# Patient Record
Sex: MALE | Employment: UNEMPLOYED | URBAN - METROPOLITAN AREA
[De-identification: names, ages, dates, MRNs, and addresses within clinical notes are randomized per-mention and may not be internally consistent; named-entity substitution may affect disease eponyms.]

---

## 2021-11-11 ENCOUNTER — HOSPITAL ENCOUNTER (OUTPATIENT)
Age: 36
Discharge: HOME | End: 2021-11-11
Payer: COMMERCIAL

## 2021-11-11 ENCOUNTER — OFFICE VISIT (OUTPATIENT)
Dept: RHEUMATOLOGY | Age: 36
End: 2021-11-11
Payer: COMMERCIAL

## 2021-11-11 ENCOUNTER — HOSPITAL ENCOUNTER (OUTPATIENT)
Dept: GENERAL RADIOLOGY | Age: 36
Discharge: HOME | End: 2021-11-11
Payer: COMMERCIAL

## 2021-11-11 VITALS
DIASTOLIC BLOOD PRESSURE: 80 MMHG | SYSTOLIC BLOOD PRESSURE: 124 MMHG | WEIGHT: 151 LBS | BODY MASS INDEX: 22.88 KG/M2 | HEIGHT: 68 IN

## 2021-11-11 DIAGNOSIS — Z79.899 HIGH RISK MEDICATION USE: ICD-10-CM

## 2021-11-11 DIAGNOSIS — M05.79 RHEUMATOID ARTHRITIS INVOLVING MULTIPLE SITES WITH POSITIVE RHEUMATOID FACTOR (HCC): ICD-10-CM

## 2021-11-11 DIAGNOSIS — M05.79 RHEUMATOID ARTHRITIS INVOLVING MULTIPLE SITES WITH POSITIVE RHEUMATOID FACTOR (HCC): Primary | ICD-10-CM

## 2021-11-11 LAB
A/G RATIO: 0.8 (ref 1.1–2.2)
ALBUMIN SERPL-MCNC: 3.7 G/DL (ref 3.4–5)
ALP BLD-CCNC: 121 U/L (ref 40–129)
ALT SERPL-CCNC: 10 U/L (ref 10–40)
ANION GAP SERPL CALCULATED.3IONS-SCNC: 14 MMOL/L (ref 3–16)
AST SERPL-CCNC: 13 U/L (ref 15–37)
BILIRUB SERPL-MCNC: 0.6 MG/DL (ref 0–1)
BUN BLDV-MCNC: 8 MG/DL (ref 7–20)
CALCIUM SERPL-MCNC: 8.9 MG/DL (ref 8.3–10.6)
CHLORIDE BLD-SCNC: 100 MMOL/L (ref 99–110)
CO2: 24 MMOL/L (ref 21–32)
CREAT SERPL-MCNC: 0.7 MG/DL (ref 0.9–1.3)
GFR AFRICAN AMERICAN: >60
GFR NON-AFRICAN AMERICAN: >60
GLUCOSE BLD-MCNC: 76 MG/DL (ref 70–99)
POTASSIUM SERPL-SCNC: 4.1 MMOL/L (ref 3.5–5.1)
SODIUM BLD-SCNC: 138 MMOL/L (ref 136–145)
TOTAL PROTEIN: 8.4 G/DL (ref 6.4–8.2)

## 2021-11-11 PROCEDURE — 73130 X-RAY EXAM OF HAND: CPT

## 2021-11-11 PROCEDURE — 99205 OFFICE O/P NEW HI 60 MIN: CPT | Performed by: INTERNAL MEDICINE

## 2021-11-11 RX ORDER — PREDNISONE 10 MG/1
TABLET ORAL
Qty: 90 TABLET | Refills: 0 | Status: SHIPPED | OUTPATIENT
Start: 2021-11-11 | End: 2022-01-21 | Stop reason: SDUPTHER

## 2021-11-11 RX ORDER — FOLIC ACID 1 MG/1
TABLET ORAL
Qty: 90 TABLET | Refills: 0 | Status: SHIPPED | OUTPATIENT
Start: 2021-11-11 | End: 2022-06-15 | Stop reason: ALTCHOICE

## 2021-11-11 RX ORDER — COVID-19 ANTIGEN TEST
KIT MISCELLANEOUS
COMMUNITY

## 2021-11-11 RX ORDER — FERROUS SULFATE 325(65) MG
325 TABLET ORAL
COMMUNITY

## 2021-11-11 NOTE — PATIENT INSTRUCTIONS
RHEUMATOID ARTHRITIS People have long feared rheumatoid arthritis (commonly called RA) as one of the most disabling types of arthritis. The good news is that the outlook has greatly improved for many people with newly diagnosed (detected) RA. Of course, RA remains a serious disease, and one that can vary widely in symptoms (what you feel) and outcomes. Even so, treatment advances have made it possible to stop or at least slow the progression (worsening) of joint damage. Rheumatologists now have many new treatments that target the inflammation that RA causes. They also understand better when and how to use treatments to get the best effects. Fast facts  RA is an autoimmune disease. A faulty immune system (the bodys defense system) triggers it. RA is the most common type of autoimmune arthritis. At least 1.3 million [place] adults have RA. Treatments have improved greatly and help many of those affected. Rheumatologists are doctors who have the expertise to correctly diagnose this disease and to offer patients the most advanced treatments. What is rheumatoid arthritis? RA is a chronic (long-term) disease that causes pain, stiffness, swelling and limited motion and function of many joints. While RA can affect any joint, the small joints in the hands and feet tend to be involved most often. Inflammation sometimes can affect organs as well, for instance, the eyes or lungs. The stiffness seen in active RA is most often worst in the morning. It may last one to two hours (or even the whole day). Stiffness for a long time in the morning is a clue that you may have RA, since few other arthritic diseases behave this way. For instance, osteoarthritis most often does not cause prolonged morning stiffness. Other signs and symptoms that can occur in RA include:   Loss of energy   Low fevers   Loss of appetite   Dry eyes and mouth from a related health problem, Sjogren's syndrome   Firm lumps, called rheumatoid physical exam, such as warmth, swelling and pain in the joints. Some blood tests also can help confirm RA. Telltale signs include: Anemia (a low red blood cell count)   Rheumatoid factor (an antibody, or blood protein, found in about 80% of patients with RA in time, but in as few as 30% at the start of arthritis)   Antibodies to cyclic citrullinated peptides (pieces of proteins), or anti-CCP for short (found in 60-70% of patients with RA)   Elevated erythrocyte sedimentation rate (a blood test that, in most patients with RA, confirms the amount of inflammation in the joints)  X-rays can help in detecting RA, but may not show anything abnormal in early arthritis. Even so, these first X-rays may be useful later to show if the disease is progressing. Often, MRI and ultrasound scanning are done to help  the severity of RA. There is no single test that confirms an RA diagnosis for most patients with this disease. (This is above all true for patients who have had symptoms fewer than six months.) Rather, a doctor makes the diagnosis by looking at the symptoms and results from the physical exam, lab tests and X-rays. How is rheumatoid arthritis treated? Therapy for RA has improved greatly in the past 30 years. Current treatments give most patients good or excellent relief of symptoms and let them keep functioning at, or near, normal levels. With the right medications, many patients can achieve remission -- that is, have no signs of active disease. There is no cure for RA. The goal of treatment is to lessen your symptoms and poor function. Doctors do this by starting proper medical therapy as soon as possible, before your joints have lasting damage. No single treatment works for all patients. Many people with RA must change their treatment at least once during their lifetime. Good control of RA requires early diagnosis and, at times, aggressive treatment.  Thus, patients with a diagnosis of RA should begin their treatment with disease-modifying antirheumatic drugs -- referred to as DMARDs. These drugs not only relieve symptoms but also slow progression of the disease. Often, doctors prescribe DMARDs along with nonsteroidal anti-inflammatory drugs or NSAIDs and/or low-dose corticosteroids, to lower swelling, pain and fever. DMARDs have greatly improved the symptoms, function and quality of life for nearly all patients with RA. Ask your rheumatologist about the need for DMARD therapy and the risks and benefits of these drugs. Common DMARDs include methotrexate (brand names include Rheumatrex® and Folex®), leflunomide ([address]), hydroxychloroquine (Plaquenil) and sulfasalazine (Azulfidine). Older DMARDs include gold, given as a pill -- auranofin (Ridaura) -- or more often as an injection into a muscle (such as Myochrysine). The antibiotic minocycline (e.g., Minocin, Dynacin and Vectrin) also is a DMARD, as are the immune suppressants azathioprine (Imuran) and cyclosporine (Sandimmune and Neoral). These three drugs and gold are rarely prescribed for RA these days because other drugs work better or have fewer side effects. Patients with more serious disease may need medications called biologic response modifiers or biologic agents.  They can target the parts of the immune system and the signals that lead to inflammation and joint and tissue damage. These medications are also DMARDs. FDA-approved drugs of this type include abatacept (Orencia), adalimumab (Humira), anakinra (Kineret), certolizumab (Cimzia), etanercept (Enbrel), golimumab (Simponi) infliximab (Remicade), rituximab (Rituxan) and tocilizumab (Actemra). Most often, patients take these drugs with methotrexate, as the mix of medicines is more helpful. The best treatment of RA needs more than medicines alone. Patient education, such as how to cope with RA, also is important.  Proper care requires the expertise of a team of providers, including rheumatologists, primary care physicians, and physical and occupational therapists. You will need frequent visits through the year with your rheumatologist. These checkups let your doctor track the course of your disease and check for any side effects of your medications. You likely also will need to repeat blood tests and X-rays or ultrasounds from time to time. What is the broader health impact of rheumatoid arthritis? Research shows that people with RA, mainly those whose disease is not well controlled, have a higher risk for heart disease and stroke. Talk with your doctor about these risks and ways to lower them. Living with rheumatoid arthritis   It is important to be physically active most of the time, but to sometimes scale back activities when the disease flares. In general, rest is helpful when a joint is inflamed, or when you feel tired. At these times, do gentle range-of-motion exercises, such as stretching. This will keep the joint flexible. When you feel better, do low-impact aerobic exercises, such as walking, and exercises to boost muscle strength. This will improve your overall health and reduce pressure on your joints. A physical or occupational therapist can help you find which types of activities are best for you, and at what level or pace you should do them. Finding that you have a chronic illness is a life-changing event. It can cause worry and sometimes feelings of isolation or depression. Thanks to greatly improved treatments, these feelings tend to decrease with time as energy improves, and pain and stiffness decrease. Discuss these normal feelings with your health care providers. They can provide helpful information and resources. Rheumatoid arthritis affects the wrist and the small joints of the hand, including the knuckles and the middle joints of the fingers. Points to remember  Newer treatments are effective. RA drugs have greatly improved outcomes for patients.  For most people with RA, early treatment can control joint pain and swelling, and lessen joint damage. Seek an expert in arthritis: a rheumatologist. Expertise is vital to make an early diagnosis of RA and to rule out diseases that mimic RA, thus avoiding unneeded tests and treatments. A doctor who is an expert in RA also can design a customized treatment plan that is best suited for you. Therefore, the rheumatologist, working with the primary care physician and other health care providers, should supervise the treatment of the patient with RA. Start treatment early. Studies show that people who receive early treatment for RA feel better sooner and more often, and are more likely to lead an active life. They also are less likely to have the type of joint damage that leads to joint replacement. The rheumatologist's role in the treatment of rheumatoid arthritis   RA is a complex disease, but many advances in treatment have occurred recently. Rheumatologists are doctors who are experts in diagnosing and treating arthritis and other diseases of the joints, muscles and bones. Thus, they are best qualified to make a proper diagnosis of RA. They can also advise patients about the best treatment options. To find a rheumatologist  For a list of rheumatologists in your area, click here. Learn more about rheumatologists and rheumatology health professionals. For more information  The [facility] [employer] of Rheumatology has compiled this list to give you a starting point for your own additional research. The ACR does not endorse or maintain these web sites, and is notresponsible for any information or claims provided on them. It is always best to talk with your rheumatologist for more information and before making any decisions about your care. Rheumatology [address] advances research and training to improve the health of people with rheumatic diseases. www. rheumatology. org/REF  The Arthritis Foundation  www. arthritis. [employer] of Arthritis and Musculoskeletal and [address]  www.niams. nih.gov   Updated August 2012  Written by Carmen Bliss MD, and Mary Lewis MD, and reviewed by the [employer] of Rheumatology Communications and Marketing Committee. This patient fact sheet is provided for general education only. Individuals should consult a qualified health care provider for professional medical advice, diagnosis and treatment of a medical or health condition.   [facility] of Rheumatology

## 2021-11-11 NOTE — PROGRESS NOTES
[address], MD                                                           [address] [place] [address] [phone] (P) [phone] (F)      Note is transcribed using voice recognition software. Inadvertent computerized transcription errors may be present. Patient identification: Judd Hill, waleska : 1985,36 y.o. REASON FOR CONSULTATION:  Patient is being seen at the request of  No primary care provider on file. / Veronica Caldwell, *for evaluation management of rheumatoid arthritis. HISTORY OF PRESENT ILLNESS:  29 y.o. male has been experiencing musculoskeletal discomfort for about a year-mild intermittent but has been constant and severe for last couple of months-affecting all joints in his upper and lower extremities-associated with pain, swelling, stiffness-worse in the morning and after inactivity, states that all his bones ache and hurt. It is affecting ADLs and recreational activities. Laboratory evaluation positive JACEK 1-160, rheumatoid factor 63, sedimentation rate 120, CRP 38, normal uric acid. CBC normocytic normochromic anemia and hemoglobin of 10 g%. No psoriasis, inflammatory bowel diseases, or any family history of rheumatoid arthritis. All other review of systems are negative. PMH, PSH,Social history , Meds reviewed. FH-no family history of RA. Autoimmune disorders. PHYSICAL EXAM:    Vitals:    /80   Ht 5' 8\" (1.727 m)   Wt 151 lb (68.5 kg)   BMI 22.96 kg/m²   AA)x3 well nourished, and well groomed, normal judgement.     MKS:   28 joint JOINT COUNT:                               Right                                                  Left   Swell Tender Swell Tender   PIP1    x x  x   PIP2    x  x x    PIP3  xxx  x  x  x   PIP4          PIP5  x  x      MCP1           MCP2  x  x  x  x   MCP3  x    x  x   MCP4  x  x       MCP5    x       Wrist  x  x  x  x   Elbow    x    x   Shoulder    x   x   Knee    x    x     Fist 60% b/l approx  limited ROM at wrist by 50 %  Shoulder b/l limited by approx 50%  B/l ankles tender in joint line , left ankle swollen, tender, inflamed  MTP are tender b/l. Skin: No rashes, no induration or skin thickening or nodules. HEENT: Normal lids, lacrimal glands and pupils. No oral or nasal ulcers. Salivary glands reveal no evidence of abnormality. External inspection of the ears and nose within normal limits. DATA:   See HPI    ASSESSMENT AND PLAN:  Tess Harrell was seen today for establish care and joint pain. Diagnoses and all orders for this visit:    Rheumatoid arthritis involving multiple sites with positive rheumatoid factor (HCC)  -     XR HAND LEFT (MIN 3 VIEWS); Future  -     XR HAND RIGHT (MIN 3 VIEWS); Future    High risk medication use  -     predniSONE (DELTASONE) 10 MG tablet; 3 tab po daily x 7 days. 2 tab po daily x 7 days. 1.5 tab po daily x 7 days. 1 tab daily therafter  -     Comprehensive Metabolic Panel; Future  -     Hepatitis B Surface Antigen; Future  -     Hepatitis B Core Antibody, IgM; Future  -     Hepatitis B Surface Antibody; Future  -     Hepatitis C Antibody; Future  -     Cyclic Citrul Peptide Antibody, IgG; Future  -     JACEK Reflex to Antibody Cascade; Future  -     Cancel: Urinalysis; Future  -     folic acid (FOLVITE) 1 MG tablet; Take 1 tab po daily. -     methotrexate (RHEUMATREX) 2.5 MG chemo tablet; Week 1: 4 tablets by mouth once a week. Week 2: 5 tablets /week. Week 3: 6 tablets/ week, thereafter: 7 tablets/ week. Same day of the week every week.  Safety alert: Labs as recommended by prescribing MD.      New diagnosis of seropositive rheumatoid arthritis-onset fall 2021-presented with inflammatory polyarthritis affecting small, intermediate and large joints. Plan-  Explained diagnosis and management options and need for further work-up as above to prepare for medications as well as to assess disease activity. Various treatment options were discussed with patient including conventional DMARDs, biologic therapy as well as rescue medications. Start methotrexate folic acid and prednisone. Side effects, directions, monitoring explained including but not limited to immune suppression, infection risk, BM,GI/Liver/lung/renal toxicity, malignancy, fertility and teratogenic effect, need for reliable contraception is must and conception needs to be planned. This is a high risk medication- requires periodic blood monitoring & office visits,  alcohol abstinence, hold in case of any infection. Please read package insert and call us with any question. Discuss with primary provider for age specific vaccinations and cancer screening. Reading information provided. Patient verbalizes the understanding. All his questions were answered. Reassessment in 4 weeks. #################################################################################################  Patient indicates understanding and agrees with the management plan. Total time >80 minutes that includes the following-  Preparing to see the patient such as reviewing patients records, pre-charting, preparing the visit on the same day, performing a medically appropriate history and physical examination, counseling and educating patient about diagnosis, management plan, ordering appropriate testings, prescriptions, communicating findings to other care providers, and documenting clinical information in electronic medical record. I thank you for giving me the opportunity to be involved in [address] [place] [address] and I look forward following Aleah along with you. If you have any questions or concerns please feel free to contact me at any time.   Maira Manzanares MD 11/11/21

## 2021-11-12 ENCOUNTER — HOSPITAL ENCOUNTER (OUTPATIENT)
Age: 36
Discharge: HOME | End: 2021-11-12
Payer: COMMERCIAL

## 2021-11-12 DIAGNOSIS — R76.8 HEPATITIS B SURFACE ANTIGEN POSITIVE: ICD-10-CM

## 2021-11-12 DIAGNOSIS — R76.8 HEPATITIS B SURFACE ANTIGEN POSITIVE: Primary | ICD-10-CM

## 2021-11-12 LAB
ANTI-NUCLEAR ANTIBODY (ANA): NEGATIVE
CYCLIC CITRULLINATED PEPTIDE ANTIBODY IGG: 73.2 U/ML (ref 0–2.9)
HBV SURFACE AB TITR SER: 10.77 MIU/ML
HEPATITIS B CORE IGM ANTIBODY: NORMAL
HEPATITIS B SURFACE ANTIGEN INTERPRETATION: REACTIVE
HEPATITIS C ANTIBODY INTERPRETATION: NORMAL

## 2021-11-12 PROCEDURE — 36415 COLL VENOUS BLD VENIPUNCTURE: CPT

## 2021-11-12 PROCEDURE — 86705 HEP B CORE ANTIBODY IGM: CPT

## 2021-11-12 PROCEDURE — 87341 HEP B SURFACE AG NEUTRLZJ IA: CPT

## 2021-11-12 PROCEDURE — 87517 HEPATITIS B DNA QUANT: CPT

## 2021-11-12 PROCEDURE — 87340 HEPATITIS B SURFACE AG IA: CPT

## 2021-11-13 LAB
HEPATITIS B CORE IGM ANTIBODY: NORMAL
HEPATITIS B SURFACE ANTIGEN INTERPRETATION: REACTIVE

## 2021-11-16 LAB
HBV QNT LOG, IU/ML: 4.02 LOG IU/ML
HBV QNT, IU/ML: ABNORMAL IU/ML
INTERPRETATION: DETECTED

## 2021-11-17 ENCOUNTER — OFFICE VISIT (OUTPATIENT)
Dept: RHEUMATOLOGY | Age: 36
End: 2021-11-17
Payer: COMMERCIAL

## 2021-11-17 VITALS
HEIGHT: 68 IN | SYSTOLIC BLOOD PRESSURE: 124 MMHG | WEIGHT: 151 LBS | BODY MASS INDEX: 22.88 KG/M2 | DIASTOLIC BLOOD PRESSURE: 80 MMHG

## 2021-11-17 DIAGNOSIS — B19.10 HEPATITIS B INFECTION WITHOUT DELTA AGENT WITHOUT HEPATIC COMA, UNSPECIFIED CHRONICITY: ICD-10-CM

## 2021-11-17 DIAGNOSIS — M05.79 RHEUMATOID ARTHRITIS INVOLVING MULTIPLE SITES WITH POSITIVE RHEUMATOID FACTOR (HCC): Primary | ICD-10-CM

## 2021-11-17 LAB
HEPATITIS B SURFACE ANTIGEN CONFIRMATION: POSITIVE
HEPATITIS B SURFACE ANTIGEN CONFIRMATION: POSITIVE

## 2021-11-17 PROCEDURE — 99215 OFFICE O/P EST HI 40 MIN: CPT | Performed by: INTERNAL MEDICINE

## 2021-11-17 NOTE — PROGRESS NOTES
[address], MD                                                           [address] [place] [address]                                                             [phone] (P) [phone] (F)      Note is transcribed using voice recognition software. Inadvertent computerized transcription errors may be present. Patient identification: Za Purvis, male : 1985,36 y.o. Background information:  1. Seropositive rheumatoid arthritis-new diagnosis-symptom onset 2021. Sporadic, self-limiting infrequent flares since early part of . Serologically-RF positive-63, CCP 73, JACEK 1-160.    2.  Hepatitis B infection-with positive surface antigen and viral load-labs 2021. Incidental detection during inflammatory arthritis work-up. Normal LFTs. Does not recall how he might have acquired it. Interval changes:  I saw him as a new patient on 2021 for new onset of inflammatory arthritis-seropositive rheumatoid arthritis. Hepatitis serologies were ordered to prepare for methotrexate treatment and also for work-up of inflammatory arthritis. Has hep B infection with positive viral load. Is currently taking 20 mg of prednisone which is helping 70% in terms of his joint symptoms. Liver function test is normal.    PMH, PSH,Social history , Meds reviewed. FH-no family history of RA. Autoimmune disorders. PHYSICAL EXAM:    Vitals:    /80   Ht 5' 8\" (1.727 m)   Wt 151 lb (68.5 kg)   BMI 22.96 kg/m²   AA)x3 well nourished, and well groomed, normal judgement.     MKS:   28 joint JOINT COUNT:                               Right                                                  Left   Swell Tender Swell Tender   PIP1           PIP2          PIP3  x  x PIP4          PIP5        MCP1           MCP2  x  x       MCP3  x  x       MCP4           MCP5          Wrist  x  x       Elbow           Shoulder          Knee             Fist 70% R- full left  limited ROM R wrist by 50 %  Shoulder FROM  MTP are tender b/l. Skin: No rashes, no induration or skin thickening or nodules. DATA:   See HPI    ASSESSMENT AND PLAN:  Tramaine Sun was seen today for follow-up and abnormal test results. Diagnoses and all orders for this visit:    Rheumatoid arthritis involving multiple sites with positive rheumatoid factor ([facility] [address]    Hepatitis B infection without delta agent without hepatic coma, unspecified chronicity  -     [facility] - Tripp Jeff MD, Gastroenterology, [place]      New diagnosis of seropositive rheumatoid arthritis-onset fall 2021. Hep B infection with viral load + and HBs Ag +    Normal LFTs. Plan-  1. Hep B infection- Refer to GI for further management. He  will be needing immunosuppressive  Medication for RA- either anti-TNF therapy or abatacept after hep B viral load is negative. Patient is aware that there is always a risk of reactivation of Hep B even after Rx but less with these agents compared to Rituxan. Avoid methotrexate. Referral documents, labs faxed to Dr Melba Trujillo. Explained diagnosis, possible route of transmission, and the need for his partner to be checked for hepatitis B infection as well. Patient showed understanding. 2.  Seropositive RA-currently taking 20 mg of prednisone, recommend reducing 10 mg a day. We will meet back after hep C treatment to plan for biologic therapy. #################################################################################################  Patient indicates understanding and agrees with the management plan.   Total time >40 minutes that includes the following-  Preparing to see the patient such as reviewing patients records, pre-charting, preparing the visit on the same day, performing a medically appropriate history and physical examination, counseling and educating patient about diagnosis, management plan, ordering appropriate testings, prescriptions, communicating findings to other care providers, and documenting clinical information in electronic medical record. I thank you for giving me the opportunity to be involved in [address] [place] [address] and I look forward following Aleah along with you. If you have any questions or concerns please feel free to contact me at any time.   Maira Manzanares MD 11/17/21

## 2022-02-23 DIAGNOSIS — Z79.899 HIGH RISK MEDICATION USE: ICD-10-CM

## 2022-02-23 RX ORDER — PREDNISONE 10 MG/1
TABLET ORAL
Qty: 30 TABLET | Refills: 0 | OUTPATIENT
Start: 2022-02-23

## 2022-03-07 ENCOUNTER — OFFICE VISIT (OUTPATIENT)
Dept: RHEUMATOLOGY | Age: 37
End: 2022-03-07
Payer: COMMERCIAL

## 2022-03-07 VITALS
WEIGHT: 150 LBS | DIASTOLIC BLOOD PRESSURE: 74 MMHG | SYSTOLIC BLOOD PRESSURE: 118 MMHG | HEIGHT: 68 IN | BODY MASS INDEX: 22.73 KG/M2

## 2022-03-07 DIAGNOSIS — B19.10 HEPATITIS B INFECTION WITHOUT DELTA AGENT WITHOUT HEPATIC COMA, UNSPECIFIED CHRONICITY: ICD-10-CM

## 2022-03-07 DIAGNOSIS — Z79.899 HIGH RISK MEDICATION USE: ICD-10-CM

## 2022-03-07 DIAGNOSIS — M05.79 RHEUMATOID ARTHRITIS INVOLVING MULTIPLE SITES WITH POSITIVE RHEUMATOID FACTOR (HCC): Primary | ICD-10-CM

## 2022-03-07 LAB
C-REACTIVE PROTEIN: 44.6 MG/L (ref 0–5.1)
SEDIMENTATION RATE, ERYTHROCYTE: 110 MM/HR (ref 0–15)

## 2022-03-07 PROCEDURE — 99215 OFFICE O/P EST HI 40 MIN: CPT | Performed by: INTERNAL MEDICINE

## 2022-03-07 RX ORDER — PREDNISONE 10 MG/1
TABLET ORAL
Qty: 90 TABLET | Refills: 0 | Status: SHIPPED | OUTPATIENT
Start: 2022-03-07 | End: 2022-06-15 | Stop reason: ALTCHOICE

## 2022-03-07 RX ORDER — ENTECAVIR 0.5 MG/1
0.5 TABLET, FILM COATED ORAL DAILY
COMMUNITY

## 2022-03-07 NOTE — PATIENT INSTRUCTIONS
Patient Education        etanercept  Pronunciation:  ee TAN er sept  Brand:  Enbrel, Erelzi Prefilled Syringe, Erelzi Sensoready Pen  What is the most important information I should know about etanercept? Etanercept affects your immune system. You may get infections more easily, even serious or fatal infections. Call your doctor if you have signs of infection (fever, cough, night sweats, pale skin, bruising or bleeding, loss of appetite, weight loss, feeling very tired). Using etanercept may increase your risk of developing certain types of cancer, including lymphoma. Ask your doctor about your specific risk. What is etanercept? Etanercept is a tumor necrosis factor (TNF) blocker that is used in adults to prevent joint damage caused by rheumatoid arthritis, psoriatic arthritis, or ankylosing spondylitis. Etanercept is also used to treat plaque psoriasis in adults and children at least 3years old. Etanercept is used to treat polyarticular juvenile idiopathic arthritis in children who are at least 3years old. Etanercept is sometimes used with another medicine called methotrexate. Etanercept may also be used for purposes not listed in this medication guide. What should I discuss with my healthcare provider before using etanercept? You should not use etanercept if you are allergic to it, or if you have a severe infection such as sepsis (infection throughout your body). Tell your doctor if you have any signs of infection, such as:   · fever, chills, sweats, flu-like symptoms, feeling very tired;  · cough, shortness of breath, coughing up blood;  · diarrhea, weight loss;  · skin warmth or redness, open sores; or  · increased urination, burning when you urinate.   Tell your doctor if you have ever had:  · a weak immune system, HIV, tuberculosis;  · hepatitis B;  · diabetes;  · congestive heart failure;  · a nerve disorder such as multiple sclerosis or Guillain-Barré syndrome;  · seizures;  · a latex allergy; or  · if you are scheduled to receive any vaccines. Children should be current on all childhood immunizations before starting treatment with etanercept. Using etanercept may increase your risk of developing certain types of cancer, including lymphoma. This has occurred mainly in children and teenagers using TNF-blockers. However, anyone with an inflammatory autoimmune disorder may have a higher risk of lymphoma. Talk with your doctor about your own risk. Tell your doctor if you have ever had tuberculosis or if anyone in your household has tuberculosis. Also tell your doctor if you have recently traveled. Tuberculosis and some fungal infections are more common in certain parts of the world, and you may have been exposed during travel. Tell your doctor if you are pregnant or plan to become pregnant. You will need to tell your baby's doctor if you used etanercept during pregnancy, especially before the baby receives any childhood vaccines. It may not be safe to breastfeed while using this medicine. Ask your doctor about any risk. How should I use etanercept? Follow all directions on your prescription label and read all medication guides or instruction sheets. Use the medicine exactly as directed. Before you start treatment with etanercept, your doctor may perform tests to make sure you do not have an infection. Etanercept is injected under the skin. A healthcare provider may teach you how to properly use the medication by yourself. Read and carefully follow any Instructions for Use provided with your medicine. Ask your doctor or pharmacist if you don't understand all instructions. Do not shake this medicine. Prepare an injection only when you are ready to give it. Do not use if the medicine looks cloudy, has changed colors, or has particles in it. Call your pharmacist for new medicine. You may need to mix etanercept with a liquid (diluent).  When using injections by yourself, be sure you understand how to properly mix and store the medicine. Etanercept doses are based on weight in children. Your child's dose needs may change if the child gains or loses weight. If you need surgery, tell the surgeon ahead of time that you are using etanercept. Etanercept affects your immune system. You may get infections more easily, even serious or fatal infections. Your doctor will need to examine you on a regular basis. If you've ever had hepatitis B, using etanercept can cause this virus to become active or get worse. You may need frequent liver function tests while using this medicine and for several months after you stop. Carefully follow all storage instructions provided with your medicine. Cartridges, injection pens, prefilled syringes, vials, and diluent are stable at specific temperatures for only a certain number of days or weeks. Throw away any medicine not used within that time. Keep unopened etanercept in its original carton in the refrigerator. Protect from light. Do not freeze. Do not use after the expiration date on the label has passed. You may also store etanercept at room temperature for a short time. Enbrel may be kept at room temperature for up to 14 days. Shonna Florian may be kept at room temperature for up to 28 days. If you store your medicine at room temperature, throw it away after the length of time stated for the brand you are using. Protect the medicine from light and extreme hot or cold temperatures. Once the medicine has reached room temperature, do not put it back into the refrigerator. Each cartridge, injection pen, or prefilled syringe is for one use only. Throw it away after one use, even if there is still medicine left inside. Use a needle and syringe only once and then place them in a puncture-proof \"sharps\" container. Follow state or local laws about how to dispose of this container. Keep it out of the reach of children and pets. What happens if I miss a dose?   Call your doctor for instructions if you miss a dose of etanercept. What happens if I overdose? Seek emergency medical attention or call the Poison Help line at 1-457.220.6071. What should I avoid while using etanercept? Avoid injecting etanercept into skin that is bruised, tender, red, or hard. Do not receive a \"live\" vaccine while using etanercept. The vaccine may not work as well and may not fully protect you from disease. Live vaccines include measles, mumps, rubella (MMR), polio, rotavirus, typhoid, yellow fever, varicella (chickenpox), and zoster (shingles). Avoid being near people who are sick or have infections. Call your doctor for preventive treatment if you are exposed to chickenpox or measles. These conditions can be serious or even fatal in people who are using etanercept. What are the possible side effects of etanercept? Get emergency medical help if you have signs of an allergic reaction:  hives; difficult breathing; swelling of your face, lips, tongue, or throat. Serious and sometimes fatal infections may occur.    Call your doctor right away if you have:  · fever, chills, flu symptoms;  · pale skin, easy bruising or bleeding;  · pain, redness, or swelling where etanercept was injected (for longer than 5 days after injection);  · signs of lymphoma --fever, night sweats, weight loss, stomach pain or swelling, swollen glands (in your neck, armpits, or groin);  · signs of tuberculosis --cough, night sweats, loss of appetite, weight loss, feeling very tired;  · new or worsening psoriasis --skin redness or scaly patches, raised bumps filled with pus;  · nerve problems --dizziness, numbness or tingling, problems with vision, or weak feeling in your arms or legs;  · signs of heart failure --shortness of breath, swelling in your lower legs;  · lupus-like syndrome --joint pain or swelling, chest discomfort, feeling short of breath, skin rash on your cheeks or arms (worsens in sunlight); o  · liver problems --right-sided upper stomach pain, vomiting, tiredness, loss of appetite, yellowing of your skin or eyes. Common side effects may include:  · pain, swelling, itching, or redness where the medicine was injected; or  · cold symptoms such as stuffy nose, sneezing, sore throat. This is not a complete list of side effects and others may occur. Call your doctor for medical advice about side effects. You may report side effects to FDA at 3-677-PMW-6704. What other drugs will affect etanercept? Tell your doctor about all your other medicines, especially:  · abatacept (Orencia);  · anakinra (Kineret);  · cyclophosphamide (Cytoxan); or  · insulin or oral diabetes medicine. This list is not complete. Other drugs may affect etanercept, including prescription and over-the-counter medicines, vitamins, and herbal products. Not all possible drug interactions are listed here. Where can I get more information? Your doctor or pharmacist can provide more information about etanercept. Remember, keep this and all other medicines out of the reach of children, never share your medicines with others, and use this medication only for the indication prescribed. Every effort has been made to ensure that the information provided by [address] is accurate, up-to-date, and complete, but no guarantee is made to that effect. Drug information contained herein may be time sensitive. [employer] information has been compiled for use by healthcare practitioners and consumers in the [place] and therefore [employer] does not warrant that uses outside of the [place] are appropriate, unless specifically indicated otherwise. [employer]'s drug information does not endorse drugs, diagnose patients or recommend therapy.  [employer] drug information is an informational resource designed to assist licensed healthcare practitioners in caring for their patients and/or to serve consumers viewing this service as a supplement to, and not a substitute for, the expertise, skill, knowledge and judgment of healthcare practitioners. The absence of a warning for a given drug or drug combination in no way should be construed to indicate that the drug or drug combination is safe, effective or appropriate for any given patient. [facility] does not assume any responsibility for any aspect of healthcare administered with the aid of information [facility] provides. The information contained herein is not intended to cover all possible uses, directions, precautions, warnings, drug interactions, allergic reactions, or adverse effects. If you have questions about the drugs you are taking, check with your doctor, nurse or pharmacist.  Copyright 2984-3889 [address]: 20.01. Revision date: 2/19/2021. Care instructions adapted under license by [facility] ([facility]). If you have questions about a medical condition or this instruction, always ask your healthcare professional. [place] [address] any warranty or liability for your use of this information.

## 2022-03-07 NOTE — PROGRESS NOTES
[address], [place]                                                           [address] [place] [address]                                                             [phone] (P) [phone] (F)      Note is transcribed using voice recognition software. Inadvertent computerized transcription errors may be present. Patient identification: Ronnie Peñaloza, male : 1985,36 y.o. Background information:  1. Seropositive rheumatoid arthritis-new diagnosis-symptom onset 2021. Sporadic, self-limiting infrequent flares since early part of . Serologically-RF positive-63, CCP 73, JACEK 1-160.    2.  Hepatitis B infection-with positive surface antigen and viral load-labs 2021. Incidental detection during inflammatory arthritis work-up. Dr. Elif Torres, started entecavir 2022. Interval changes: This is a acute visit for worsening arthritis flare in his finger joints, wrists, ankle and feet, is limiting his ability to work at [employer]. He just finished a course of prednisone taper last week. Is not on any DMARDs because of incidental detection of hep B, started entecavir in January. Is tolerating medication well. He just saw Dr. Luh Allred earlier this month, office notes reviewed-scanned in media section. PMH, PSH,Social history , Meds reviewed. FH-no family history of RA. Autoimmune disorders. PHYSICAL EXAM:    Vitals:    /74   Ht 5' 8\" (1.727 m)   Wt 150 lb (68 kg)   BMI 22.81 kg/m²   AA)x3 well nourished, and well groomed, normal judgement.     MKS:   28 joint JOINT COUNT:                               Right                                                  Left   Swell Tender Swell Tender   PIP1           PIP2          PIP3  x  x PIP4  x x       PIP5        MCP1           MCP2  x  x  x  x   MCP3  x  x  x  x   MCP4           MCP5          Wrist  x  x  xx  x   Elbow    x  x  x   Shoulder          Knee             Right hand-loose fist, left hand full fist  Range of motion both wrist are limited, left much worse than right. Both ankles are swollen, tender, actively inflamed. MTPs are tender to pressure bilaterally. Skin: No rashes, no induration or skin thickening or nodules. DATA:   See HPI    ASSESSMENT AND PLAN:  Jericho Mccarthy was seen today for follow-up and joint pain. Diagnoses and all orders for this visit:    Rheumatoid arthritis involving multiple sites with positive rheumatoid factor (HCC)    High risk medication use  -     C-Reactive Protein; Future  -     Sedimentation Rate; Future  -     Quantiferon, Incubated; Future    Hepatitis B infection without delta agent without hepatic coma, unspecified chronicity    Other orders  -     predniSONE (DELTASONE) 10 MG tablet; Take 1 Tab daily. Seropositive rheumatoid arthritis (positives RF and CCP)-onset fall 2021. High disease activity. Hep B infection with viral load + and HBs Ag +-started entecavir January 2021. Normal LFTs. Plan-  Prednisone 20 mg X 1 week, thereafter maintain on 10 mg a day. Communicated with Dr. Gypsy Ferrera, awaiting hep B viral load, if negative-we will plan for anti-TNF therapy. Meantime, check safety labs including TB QuantiFERON. He is taking entecavir for hep B. Addendum:  Discussed with GI- Hep B virus undetectable. Will Pa Enbrel. Side effects and directions, monitoring explained including risk of Hep B reactivation if he is off of antiviral therapy. Reading info given. #################################################################################################  Patient indicates understanding and agrees with the management plan.   Total time >40 minutes that includes the following-  Preparing to see the patient such as reviewing patients records, pre-charting, preparing the visit on the same day, performing a medically appropriate history and physical examination, counseling and educating patient about diagnosis, management plan, ordering appropriate testings, prescriptions, communicating findings to other care providers, and documenting clinical information in electronic medical record. I thank you for giving me the opportunity to be involved in [address] [place] [address] and I look forward following Aleah along with you. If you have any questions or concerns please feel free to contact me at any time.   Eb Harden MD 03/07/22

## 2022-03-08 RX ORDER — ETANERCEPT 50 MG/ML
SOLUTION SUBCUTANEOUS
Qty: 4 ML | Refills: 3 | Status: SHIPPED | OUTPATIENT
Start: 2022-03-08 | End: 2022-09-15 | Stop reason: SDUPTHER

## 2022-03-10 LAB
QUANTI TB GOLD PLUS: NEGATIVE
QUANTI TB1 MINUS NIL: 0 IU/ML (ref 0–0.34)
QUANTI TB2 MINUS NIL: 0.01 IU/ML (ref 0–0.34)
QUANTIFERON MITOGEN: 1.53 IU/ML
QUANTIFERON NIL: 0.03 IU/ML

## 2022-06-15 ENCOUNTER — OFFICE VISIT (OUTPATIENT)
Dept: RHEUMATOLOGY | Age: 37
End: 2022-06-15
Payer: COMMERCIAL

## 2022-06-15 VITALS
HEIGHT: 68 IN | WEIGHT: 150 LBS | BODY MASS INDEX: 22.73 KG/M2 | SYSTOLIC BLOOD PRESSURE: 110 MMHG | DIASTOLIC BLOOD PRESSURE: 68 MMHG

## 2022-06-15 DIAGNOSIS — Z79.899 HIGH RISK MEDICATION USE: ICD-10-CM

## 2022-06-15 DIAGNOSIS — M05.79 RHEUMATOID ARTHRITIS INVOLVING MULTIPLE SITES WITH POSITIVE RHEUMATOID FACTOR (HCC): Primary | ICD-10-CM

## 2022-06-15 PROCEDURE — 99214 OFFICE O/P EST MOD 30 MIN: CPT | Performed by: INTERNAL MEDICINE

## 2022-06-15 RX ORDER — ETANERCEPT 50 MG/ML
SOLUTION SUBCUTANEOUS
Qty: 4 ML | Refills: 2 | Status: SHIPPED | OUTPATIENT
Start: 2022-06-15 | End: 2022-09-15

## 2022-06-15 RX ORDER — HYDROXYCHLOROQUINE SULFATE 200 MG/1
200 TABLET, FILM COATED ORAL 2 TIMES DAILY
Qty: 60 TABLET | Refills: 2 | Status: SHIPPED | OUTPATIENT
Start: 2022-06-15 | End: 2022-09-15 | Stop reason: SDUPTHER

## 2022-06-15 NOTE — PROGRESS NOTES
Elbow           Shoulder          Knee             Other than active disease in both wrist-normal [address] exam in upper and lower extremities. Skin: No rashes, no induration or skin thickening or nodules. DATA:   See HPI    ASSESSMENT AND PLAN:  Liseth Aguiar was seen today for follow-up. Diagnoses and all orders for this visit:    Rheumatoid arthritis involving multiple sites with positive rheumatoid factor (HCC)  -     hydroxychloroquine (PLAQUENIL) 200 MG tablet; Take 1 tablet by mouth 2 times daily    High risk medication use  -     CBC with Auto Differential; Future  -     Hepatic Function Panel; Future  -     Creatinine; Future  -     C-Reactive Protein; Future  -     Sedimentation Rate; Future    Other orders  -     Etanercept (ENBREL SURECLICK) 50 MG/ML SOAJ; Inject 50 mg sc weekly. Seropositive rheumatoid arthritis (positives RF and CCP)-onset fall 2021. Persistent activity. Continue Enbrel weekly, prescription authorized. Add hydroxychloroquine-side effects, directions, monitoring explained. Safety labs to be checked in 4 to 6 weeks. Hep B infection with viral load + and HBs Ag +-started entecavir January 2021. Normal LFTs. Call with any symptoms, RTC 3 months. #################################################################################################  Patient indicates understanding and agrees with the management plan. Total time 33 minutes that includes the following-  Preparing to see the patient such as reviewing patients records, pre-charting, preparing the visit on the same day, performing a medically appropriate history and physical examination, counseling and educating patient about diagnosis, management plan, ordering appropriate testings, prescriptions, communicating findings to other care providers, and documenting clinical information in electronic medical record.   I thank you for giving me the opportunity to be involved in [address] [place] [address] and I look forward following Aleah along with you. If you have any questions or concerns please feel free to contact me at any time.   Sury Coyle MD 06/15/22

## 2022-07-20 DIAGNOSIS — Z79.899 HIGH RISK MEDICATION USE: ICD-10-CM

## 2022-07-20 LAB
ALBUMIN SERPL-MCNC: 4.1 G/DL (ref 3.4–5)
ALP BLD-CCNC: 96 U/L (ref 40–129)
ALT SERPL-CCNC: 8 U/L (ref 10–40)
AST SERPL-CCNC: 13 U/L (ref 15–37)
BASOPHILS ABSOLUTE: 0 K/UL (ref 0–0.2)
BASOPHILS RELATIVE PERCENT: 0.7 %
BILIRUB SERPL-MCNC: 0.7 MG/DL (ref 0–1)
BILIRUBIN DIRECT: <0.2 MG/DL (ref 0–0.3)
BILIRUBIN, INDIRECT: ABNORMAL MG/DL (ref 0–1)
C-REACTIVE PROTEIN: 18.2 MG/L (ref 0–5.1)
EOSINOPHILS ABSOLUTE: 0.1 K/UL (ref 0–0.6)
EOSINOPHILS RELATIVE PERCENT: 1.2 %
HCT VFR BLD CALC: 39.4 % (ref 40.5–52.5)
HEMOGLOBIN: 12.8 G/DL (ref 13.5–17.5)
LYMPHOCYTES ABSOLUTE: 2.4 K/UL (ref 1–5.1)
LYMPHOCYTES RELATIVE PERCENT: 37.6 %
MCH RBC QN AUTO: 23.6 PG (ref 26–34)
MCHC RBC AUTO-ENTMCNC: 32.5 G/DL (ref 31–36)
MCV RBC AUTO: 72.8 FL (ref 80–100)
MONOCYTES ABSOLUTE: 0.5 K/UL (ref 0–1.3)
MONOCYTES RELATIVE PERCENT: 7.4 %
NEUTROPHILS ABSOLUTE: 3.5 K/UL (ref 1.7–7.7)
NEUTROPHILS RELATIVE PERCENT: 53.1 %
PDW BLD-RTO: 15.2 % (ref 12.4–15.4)
PLATELET # BLD: 272 K/UL (ref 135–450)
PMV BLD AUTO: 7.6 FL (ref 5–10.5)
RBC # BLD: 5.41 M/UL (ref 4.2–5.9)
SEDIMENTATION RATE, ERYTHROCYTE: 70 MM/HR (ref 0–15)
TOTAL PROTEIN: 7.8 G/DL (ref 6.4–8.2)
WBC # BLD: 6.5 K/UL (ref 4–11)

## 2022-09-13 NOTE — PROGRESS NOTES
[id number], [address]                                                             [phone] (P) [phone] (F)      Note is transcribed using voice recognition software. Inadvertent computerized transcription errors may be present. Patient identification: Polo Rhodes, male : 1985,36 y.o. Background information:  1. Seropositive rheumatoid arthritis-new diagnosis-symptom onset 2021. Sporadic, self-limiting infrequent flares since early part of . Serologically-RF positive-63, CCP 73, JACEK 1-160.    2.  Hepatitis B infection-with positive surface antigen and viral load-labs 2021. Incidental detection during inflammatory arthritis work-up. Dr. Tania Rajput, started entecavir 2022. Interval changes:  He continues to have pain, swelling, stiffness in different joints in his upper and lower extremities-mainly wrist and finger joints. This is limiting some ADLs and recreational activities. Reports about 70% improvement in his symptoms. Compliant with Enbrel and hydroxychloroquine. No side effects from medications. Is on Entecavir from hematology for hep B. Dr. Whitney Chandler is his gastroenterologist.      PMH, PSH,Social history , Meds reviewed. FH-no family history of RA. Autoimmune disorders. PHYSICAL EXAM:    Vitals:    /76   Ht 5' 8\" (1.727 m)   Wt 150 lb (68 kg)   BMI 22.81 kg/m²   AA)x3 well nourished, and well groomed, normal judgement.     MKS:   28 joint JOINT COUNT:                               Right                                                  Left   Swell Tender Swell Tender   PIP1           PIP2          PIP3  x x       PIP4 PIP5        MCP1           MCP2       MCP3 x x     MCP4           MCP5          Wrist      x  x   Elbow           Shoulder          Knee             Full fist bilaterally. Subjective arthralgias across MCPs, PIPs bilaterally. Skin: No rashes, no induration or skin thickening or nodules. DATA:   Lab Results   Component Value Date    WBC 6.5 07/20/2022    HGB 12.8 (L) 07/20/2022    HCT 39.4 (L) 07/20/2022    MCV 72.8 (L) 07/20/2022     07/20/2022     Lab Results   Component Value Date     11/11/2021    K 4.1 11/11/2021     11/11/2021    CO2 24 11/11/2021    BUN 8 11/11/2021    CREATININE 0.7 (L) 11/11/2021    GLUCOSE 76 11/11/2021    CALCIUM 8.9 11/11/2021    PROT 7.8 07/20/2022    LABALBU 4.1 07/20/2022    BILITOT 0.7 07/20/2022    ALKPHOS 96 07/20/2022    AST 13 (L) 07/20/2022    ALT 8 (L) 07/20/2022    LABGLOM >60 11/11/2021    GFRAA >60 11/11/2021    AGRATIO 0.8 (L) 11/11/2021           ASSESSMENT AND PLAN:  Kelsy Grimes was seen today for follow-up. Diagnoses and all orders for this visit:    Rheumatoid arthritis involving multiple sites with positive rheumatoid factor (HCC)  -     hydroxychloroquine (PLAQUENIL) 200 MG tablet; Take 1 tablet by mouth 2 times daily    High risk medication use  -     Creatinine; Future  -     Hepatic Function Panel; Future  -     CBC with Auto Differential; Future  -     C-Reactive Protein; Future  -     Sedimentation Rate; Future    Hepatitis B infection without delta agent without hepatic coma, unspecified chronicity    Other orders  -     Etanercept (ENBREL SURECLICK) 50 MG/ML SOAJ; Inject 50 mg sc weekly. -     sulfaSALAzine (AZULFIDINE) 500 MG tablet; Take 1 tab po BID first week, then take 2 tab po BID as tolerated. 1.  Seropositive rheumatoid arthritis (positives RF and CCP)-onset fall 2021-70% improvement in his symptoms.  -Continue hydroxychloroquine and Enbrel. Add sulfasalazine, side effects from directions, monitoring explained. Prescriptions authorized. Lab work in 6 weeks. Safety labs are normal from July 2022. Hep B infection with viral load + and HBs Ag +-started entecavir January 2021. Call with any symptoms, RTC 3 months. #################################################################################################  Patient indicates understanding and agrees with the management plan. Total time 34 minutes that includes the following-  Preparing to see the patient such as reviewing patients records, pre-charting, preparing the visit on the same day, performing a medically appropriate history and physical examination, counseling and educating patient about diagnosis, management plan, ordering appropriate testings, prescriptions, communicating findings to other care providers, and documenting clinical information in electronic medical record. I thank you for giving me the opportunity to be involved in [address] [place] [address] care and I look forward following Aleah along with you. If you have any questions or concerns please feel free to contact me at any time.   Heber Villalobos MD 09/15/22

## 2022-09-15 ENCOUNTER — OFFICE VISIT (OUTPATIENT)
Dept: RHEUMATOLOGY | Age: 37
End: 2022-09-15
Payer: COMMERCIAL

## 2022-09-15 VITALS
BODY MASS INDEX: 22.73 KG/M2 | HEIGHT: 68 IN | DIASTOLIC BLOOD PRESSURE: 76 MMHG | SYSTOLIC BLOOD PRESSURE: 110 MMHG | WEIGHT: 150 LBS

## 2022-09-15 DIAGNOSIS — B19.10 HEPATITIS B INFECTION WITHOUT DELTA AGENT WITHOUT HEPATIC COMA, UNSPECIFIED CHRONICITY: ICD-10-CM

## 2022-09-15 DIAGNOSIS — M05.79 RHEUMATOID ARTHRITIS INVOLVING MULTIPLE SITES WITH POSITIVE RHEUMATOID FACTOR (HCC): Primary | ICD-10-CM

## 2022-09-15 DIAGNOSIS — Z79.899 HIGH RISK MEDICATION USE: ICD-10-CM

## 2022-09-15 PROCEDURE — 99214 OFFICE O/P EST MOD 30 MIN: CPT | Performed by: INTERNAL MEDICINE

## 2022-09-15 RX ORDER — ETANERCEPT 50 MG/ML
SOLUTION SUBCUTANEOUS
Qty: 4 ML | Refills: 5 | Status: SHIPPED | OUTPATIENT
Start: 2022-09-15

## 2022-09-15 RX ORDER — HYDROXYCHLOROQUINE SULFATE 200 MG/1
200 TABLET, FILM COATED ORAL 2 TIMES DAILY
Qty: 180 TABLET | Refills: 2 | Status: SHIPPED | OUTPATIENT
Start: 2022-09-15

## 2022-09-15 RX ORDER — SULFASALAZINE 500 MG/1
TABLET ORAL
Qty: 120 TABLET | Refills: 0 | Status: SHIPPED
Start: 2022-09-15 | End: 2022-10-04 | Stop reason: SINTOL

## 2022-12-27 ENCOUNTER — OFFICE VISIT (OUTPATIENT)
Dept: RHEUMATOLOGY | Age: 37
End: 2022-12-27
Payer: COMMERCIAL

## 2022-12-27 VITALS
WEIGHT: 150 LBS | HEIGHT: 68 IN | SYSTOLIC BLOOD PRESSURE: 120 MMHG | BODY MASS INDEX: 22.73 KG/M2 | DIASTOLIC BLOOD PRESSURE: 76 MMHG

## 2022-12-27 DIAGNOSIS — Z79.899 HIGH RISK MEDICATION USE: Primary | ICD-10-CM

## 2022-12-27 DIAGNOSIS — B19.10 HEPATITIS B INFECTION WITHOUT DELTA AGENT WITHOUT HEPATIC COMA, UNSPECIFIED CHRONICITY: ICD-10-CM

## 2022-12-27 DIAGNOSIS — M05.79 RHEUMATOID ARTHRITIS INVOLVING MULTIPLE SITES WITH POSITIVE RHEUMATOID FACTOR (HCC): ICD-10-CM

## 2022-12-27 DIAGNOSIS — Z79.899 HIGH RISK MEDICATION USE: ICD-10-CM

## 2022-12-27 LAB
ALBUMIN SERPL-MCNC: 4.3 G/DL (ref 3.4–5)
ALP BLD-CCNC: 94 U/L (ref 40–129)
ALT SERPL-CCNC: 9 U/L (ref 10–40)
AST SERPL-CCNC: 13 U/L (ref 15–37)
BASOPHILS ABSOLUTE: 0.1 K/UL (ref 0–0.2)
BASOPHILS RELATIVE PERCENT: 0.8 %
BILIRUB SERPL-MCNC: 0.8 MG/DL (ref 0–1)
BILIRUBIN DIRECT: <0.2 MG/DL (ref 0–0.3)
BILIRUBIN, INDIRECT: ABNORMAL MG/DL (ref 0–1)
C-REACTIVE PROTEIN: <3 MG/L (ref 0–5.1)
EOSINOPHILS ABSOLUTE: 0.1 K/UL (ref 0–0.6)
EOSINOPHILS RELATIVE PERCENT: 1 %
HCT VFR BLD CALC: 41.3 % (ref 40.5–52.5)
HEMOGLOBIN: 12.9 G/DL (ref 13.5–17.5)
LYMPHOCYTES ABSOLUTE: 2.1 K/UL (ref 1–5.1)
LYMPHOCYTES RELATIVE PERCENT: 26.6 %
MCH RBC QN AUTO: 23 PG (ref 26–34)
MCHC RBC AUTO-ENTMCNC: 31.1 G/DL (ref 31–36)
MCV RBC AUTO: 73.8 FL (ref 80–100)
MONOCYTES ABSOLUTE: 0.4 K/UL (ref 0–1.3)
MONOCYTES RELATIVE PERCENT: 5 %
NEUTROPHILS ABSOLUTE: 5.3 K/UL (ref 1.7–7.7)
NEUTROPHILS RELATIVE PERCENT: 66.6 %
PDW BLD-RTO: 16.3 % (ref 12.4–15.4)
PLATELET # BLD: 284 K/UL (ref 135–450)
PMV BLD AUTO: 8.3 FL (ref 5–10.5)
RBC # BLD: 5.6 M/UL (ref 4.2–5.9)
SEDIMENTATION RATE, ERYTHROCYTE: 32 MM/HR (ref 0–15)
TOTAL PROTEIN: 7.5 G/DL (ref 6.4–8.2)
WBC # BLD: 8 K/UL (ref 4–11)

## 2022-12-27 PROCEDURE — 99214 OFFICE O/P EST MOD 30 MIN: CPT | Performed by: INTERNAL MEDICINE

## 2022-12-27 RX ORDER — HYDROXYCHLOROQUINE SULFATE 200 MG/1
200 TABLET, FILM COATED ORAL 2 TIMES DAILY
Qty: 180 TABLET | Refills: 3 | Status: SHIPPED | OUTPATIENT
Start: 2022-12-27

## 2022-12-27 NOTE — PROGRESS NOTES
Elbow           Shoulder          Knee             Range of motion limited in his right wrist, otherwise no appreciable inflammatory joint findings in exam today. Skin: No rashes, no induration or skin thickening or nodules. DATA:   Lab Results   Component Value Date    WBC 6.5 07/20/2022    HGB 12.8 (L) 07/20/2022    HCT 39.4 (L) 07/20/2022    MCV 72.8 (L) 07/20/2022     07/20/2022     Lab Results   Component Value Date     11/11/2021    K 4.1 11/11/2021     11/11/2021    CO2 24 11/11/2021    BUN 8 11/11/2021    CREATININE 0.7 (L) 11/11/2021    GLUCOSE 76 11/11/2021    CALCIUM 8.9 11/11/2021    PROT 7.8 07/20/2022    LABALBU 4.1 07/20/2022    BILITOT 0.7 07/20/2022    ALKPHOS 96 07/20/2022    AST 13 (L) 07/20/2022    ALT 8 (L) 07/20/2022    LABGLOM >60 11/11/2021    GFRAA >60 11/11/2021    AGRATIO 0.8 (L) 11/11/2021       Xray Result (most recent):  XR HAND LEFT (MIN 3 VIEWS) 11/11/2021    Narrative  Left hand 3 views    HISTORY: Rheumatoid arthritis with positive rheumatoid factor; joint pain    Periarticular osteopenia particularly of the metacarpophalangeal joints. Patchy osteopenia of the carpal bones as well. Radiocarpal joint space is obliterated with bone-on-bone configuration. Ulnar styloid process intact without erosive change. Metacarpophalangeal and interphalangeal joint spaces relatively preserved. No prominent arthropathic erosions and no periarticular calcifications. No fracture or dislocation    Impression  Periarticular osteopenia with additional osteopenia of the carpal bones, as an early radiographic sign of rheumatoid arthritis    Degenerative narrowing of the radiocarpal joint space        Right hand 3 views    HISTORY: Same    Mild periarticular osteopenia predominates at the metacarpophalangeal joints    Radiocarpal joint space is obliterated with bone-on-bone configuration. Patchy osteopenia of the carpal bones also noted.  There may be a tiny erosion at these tip of the ulnar styloid process. Metacarpophalangeal and interphalangeal joint spaces maintained without articular surface erosions or periarticular calcifications    No fracture or dislocation    IMPRESSION:    Osteopenia at the metacarpophalangeal joints and also involving the carpal bones which can be seen with early rheumatoid arthritis    Equivocal small erosion of the ulnar styloid tip. Degenerative narrowing of the radiocarpal joint space      ASSESSMENT AND PLAN:  Emmanuel Shearer was seen today for follow-up. Diagnoses and all orders for this visit:    High risk medication use    Rheumatoid arthritis involving multiple sites with positive rheumatoid factor (HCC)  -     hydroxychloroquine (PLAQUENIL) 200 MG tablet; Take 1 tablet by mouth 2 times daily    Hepatitis B infection without delta agent without hepatic coma, unspecified chronicity    Other orders  -     Etanercept (ENBREL SURECLICK) 50 MG/ML SOAJ; Inject 50 mg sc weekly. 1.  Seropositive rheumatoid arthritis (positives RF and CCP)-onset fall 2021-  Acceptable control, continue hydroxychloroquine and Enbrel. Prior medications-  Rash or sulfasalazine. Hep B infection with viral load + and HBs Ag +-started entecavir January 2021. Call with any symptoms, RTC 3 months. #################################################################################################  Patient indicates understanding and agrees with the management plan. Total time 33 minutes that includes the following-  Preparing to see the patient such as reviewing patients records, pre-charting, preparing the visit on the same day, performing a medically appropriate history and physical examination, counseling and educating patient about diagnosis, management plan, ordering appropriate testings, prescriptions, communicating findings to other care providers, and documenting clinical information in electronic medical record.   I thank you for giving me the opportunity to be involved in [address] [place] [address] care and I look forward following Aleah along with you. If you have any questions or concerns please feel free to contact me at any time.   Philip Miller MD 12/27/22